# Patient Record
(demographics unavailable — no encounter records)

---

## 2024-10-15 NOTE — HISTORY OF PRESENT ILLNESS
[None] : None [FreeTextEntry1] : Mr. Koroma is a very pleasant 68 year old man here today for elevated PSA and abnormal MRI. He was referred by Dr. Terry Ferreira. He is accompanied by caregiver. He reports a PSA last year of over 6. This year it was 21. Prostate MRI images reviewed shows PIRADS 4 in the setting of a 34 cc prostate. He denies any hematuria, dysuria or difficulties voiding. He denies straining and feels like he empties completely. Denies any family history of urological cancers. Currently smokes 1 cigarette a day.

## 2024-10-15 NOTE — ASSESSMENT
[FreeTextEntry1] : Mr. Koroma is a very pleasant 68 year old man here today for elevated PSA and abnormal MRI. He was referred by Dr. Terry Ferreira. He is accompanied by caregiver. He reports a PSA last year of over 6. This year it was 21. Prostate MRI images from [facility] radiology reviewed shows PIRADS 4 overall suspicion score in the setting of a 34 cc prostate. He denies any hematuria, dysuria or difficulties voiding. He denies straining and feels like he empties completely. Denies any family history of urological cancers. Currently smokes 1 cigarette a day. Patient was counseled on smoking cessation for 5 minutes.  We discussed various health benefits of quitting.  We discussed the potential Urologic implications of smoking, including an increased risk of bladder and upper tract urothelial carcinoma, kidney cancer, and a potential link to erectile dysfunction. I discussed the recommendation to perform a transrectal ultrasound-guided prostate biopsy with the patient.  I reviewed the potential etiologies of an elevated PSA with the patient, including but not limited to prostate cancer, benign prostatic hyperplasia (BPH), inflammation of the prostate, urinary tract infection (UTI), older age, and sexual intercourse. I discussed the risks of a prostate biopsy with the patient, including but not limited to pain, rectal bleeding, blood in the urine and semen, difficulty or inability to urinate, and infection. We discussed the procedure itself, including the need to place a transrectal ultrasound probe in the rectum and take systematic biopsies of the prostate gland after providing a local anesthetic agent.  I explained the ricardo-procedural preparations that the patient will need to take, including self-administration of an enema the day of the biopsy. He wishes to proceed and we will schedule the biopsy for the near future. Urine culture  Patient is being seen today for evaluation and management of a chronic and longitudinal ongoing condition and I am of the primary treating physician

## 2024-10-25 NOTE — ASSESSMENT
[FreeTextEntry1] : Very pleasant 68-year-old gentleman who presents for follow-up of elevated PSA, abnormal MRI -PSA 21 -Given significant difficulty obtaining MRI images, as well as images performed over 15 months ago, we will repeat MRI again at this time -We discussed the differences between prostate MRI and a prostate biopsy for evaluation for prostate cancer.  We discussed the risks and benefits of both a prostate biopsy versus a prostate MRI, including the limitations of both.  We discussed the benefit of obtaining an MRI prior to a prostate biopsy with the advantage of being able to do a transperineal fusion biopsy after MRI.  After thorough discussion of the risks and benefits of each he would like to proceed with a prostate MRI in anticipation of a fusion biopsy  Patient is being seen today for evaluation and management of a chronic and longitudinal ongoing condition and I am of the primary treating physician

## 2024-10-25 NOTE — REASON FOR VISIT
Group Topic: BH Check-in/Symptom Rating    Date: 8/16/2022  Start Time: 0900  End Time: 0945  Facilitators: Amanda Betancourt OT    Focus: Check-in  Number in attendance: 7    Method: Group  Attendance: Present  Participation: Active  Patient Response: Attentive  Mood: Normal  Affect: Type: Euthymic (normal mood)   Range: Full (normal)   Congruency: Congruent   Stability: Stable  Behavior/Socialization: Appropriate to group  Thought Process: Tracking  Task Performance: Follows directions  Patient Evaluation: Independent - full participation       [Follow-up Visit ___] : a follow-up visit  for [unfilled]

## 2024-10-25 NOTE — HISTORY OF PRESENT ILLNESS
[FreeTextEntry1] : Very pleasant 68-year-old gentleman who presents for follow-up of elevated PSA, abnormal MRI.  He underwent an MRI 7/2023 at [facility].  His PSA was recently noted to rise to 21.  He presents today to again discuss additional options for management.  Unfortunately, MRI images from [facility] were not sent, despite contacting [facility] numerous times to have them pushed to the system.

## 2024-12-04 NOTE — HISTORY OF PRESENT ILLNESS
[FreeTextEntry1] : Very pleasant 68-year-old gentleman who presents for follow-up of elevated PSA, abnormal MRI, new diagnosis of prostate cancer.  His PSA was recently noted to rise to 21.  Prostate MRI demonstrates an overall suspicion score of PIRADS 5. He therefore underwent a fusion guided prostate biopsy which demonstrated multiple cores  of Diamante group grade 2, 3, 4, 5 prostate cancer on the right.  He presents today to discuss these results as well as further management options.  No problems after the biopsy.  No fevers or chills.  No hematuria.

## 2024-12-04 NOTE — DISEASE MANAGEMENT
[1] : T1 [c] : c [X] : MX [>20] : >20 ng/mL [Biopsy with Fusion] : Patient had a biopsy with fusion on [8] : Template Biopsy Diamante Score: 8 [9] : Fusion Biopsy Diamante Score: 9 [] : Patient had a Prostate MRI [5] : 5 [IIIC] : IIIC [TotalCores] : 15 [TotalPositiveCores] : 8

## 2025-01-17 NOTE — PHYSICAL EXAM
[Normal] : oriented to person, place and time, the affect was normal, the mood was normal and not anxious No

## 2025-01-17 NOTE — DISEASE MANAGEMENT
[1] : T1 [c] : c [X] : MX [>20] : >20 ng/mL [Biopsy with Fusion] : Patient had a biopsy with fusion on [8] : Template Biopsy Diamante Score: 8 [9] : Fusion Biopsy Diamante Score: 9 [] : Patient had a Prostate MRI [5] : 5 [TotalCores] : 15 [TotalPositiveCores] : 8 [IIIC] : IIIC

## 2025-01-17 NOTE — HISTORY OF PRESENT ILLNESS
[FreeTextEntry1] : Very pleasant 68-year-old gentleman who presents for follow-up of elevated PSA, abnormal MRI, new diagnosis of prostate cancer.  His PSA was recently noted to rise to 21.  Prostate MRI demonstrates an overall suspicion score of PIRADS 5. He therefore underwent a fusion guided prostate biopsy which demonstrated multiple cores  of Diamante group grade 2, 3, 4, 5 prostate cancer on the right.  He recently underwent a PSMA PET scan which demonstrated evidence of active disease in the prostate but no evidence of metastatic disease.  He presents today to discuss these results as well as further management options.

## 2025-01-30 NOTE — REASON FOR VISIT
[Consideration of Curative Therapy] : consideration of curative therapy for prostate cancer [Formal Caregiver] : formal caregiver [Family Member] : family member

## 2025-01-30 NOTE — REASON FOR VISIT
"Chief Complaint   Patient presents with     Anxiety     initial /67 (BP Location: Right arm, Cuff Size: Adult Regular)   Pulse 83   Temp 98.9  F (37.2  C) (Oral)   Resp 16   Ht 1.753 m (5' 9\")   Wt 86 kg (189 lb 8 oz)   SpO2 98%   BMI 27.98 kg/m   Estimated body mass index is 27.98 kg/m  as calculated from the following:    Height as of this encounter: 1.753 m (5' 9\").    Weight as of this encounter: 86 kg (189 lb 8 oz).  BP completed using cuff size: regular.   R arm      Health Maintenance that is potentially due pending provider review:  NONE    n/a    Brant Nunez ma  " [Consideration of Curative Therapy] : consideration of curative therapy for prostate cancer [Formal Caregiver] : formal caregiver [Family Member] : family member

## 2025-02-05 NOTE — LETTER GREETING
[Dear  ___] : Dear  [unfilled], [Follow-Up] : Your patient, [unfilled] was seen in my office today for follow-up [Please see my note below.] : Please see my note below. [FreeTextEntry2] : Cory Mittal  [place], [place]

## 2025-02-05 NOTE — LETTER CLOSING
[Consult Closing:] : Thank you for allowing me to participate in the care of this patient.  If you have any questions, please do not hesitate to contact me. [FreeTextEntry3] : Sincerely,      Obie Silva MD, FACS Director of Urology Services, [facility] Chief of Urology, [facility]  of Urology   [facility] for [facility]rology, [place] [id number] P: [phone] F: [phone] [facility]

## 2025-02-05 NOTE — LETTER CLOSING
[Consult Closing:] : Thank you for allowing me to participate in the care of this patient.  If you have any questions, please do not hesitate to contact me. [FreeTextEntry3] : Sincerely,      Obie Silva MD, FACS Director of Urology Services, [facility] Chief of Urology, [facility]  of Urology   [facility] for Urology, [place] [id number] P: [phone] F: [phone] [facility]

## 2025-02-05 NOTE — HISTORY OF PRESENT ILLNESS
[FreeTextEntry1] : ELIZABETH ARIAS presents to the office today. He is a 68 year-old man, referred by Dr. Darden to discuss prostate cancer treatment. Biopsy showed [place] 4+5, [place] 4+3, and [place] 3+4. He recently underwent a PSMA PET scan which demonstrated evidence of active disease in the prostate but no evidence of metastatic disease.  Patient has underlying medical issues including diagnosis of Parkinson's disease from about a year ago and he has been placed on medical therapy for this.  He also has been having some balance issues with a few falls but no serious injuries.  His aide with him today also reports that he has some herniated disks up in his cervical spine region and he has a surgical appointment pending to evaluate for possible surgery to correct the herniated disks.  He is mobile but often needs assistance.  He denies any significant bothersome urinary symptoms at this time such as weak stream, hesitancy or sensation of incomplete emptying.  He denies urgency or frequency issues.

## 2025-02-05 NOTE — LETTER CLOSING
[Consult Closing:] : Thank you for allowing me to participate in the care of this patient.  If you have any questions, please do not hesitate to contact me. [FreeTextEntry3] : Sincerely,      Obie Silva MD, FACS Director of Urology Services, [facility] Chief of Urology, [facility]  of Urology   [facility] for Urology, [place] [id number] P: [phone] F: [phone] [facility]

## 2025-02-05 NOTE — HISTORY OF PRESENT ILLNESS
[FreeTextEntry1] : ELIZABETH ARIAS presents to the office today. He is a 68 year-old man, referred by Dr. Darden to discuss prostate cancer treatment. Biopsy showed [place] 4+5, [place] 4+3, and [place] 3+4. He recently underwent a PSMA PET scan which demonstrated evidence of active disease in the prostate but no evidence of metastatic disease.  Patient has underlying medical issues including diagnosis of Parkinson's disease from about a year ago and he has been placed on medical therapy for this.  He also has been having some balance issues with a few falls but no serious injuries.  His aide with him today also reports that he has some herniated disks up in his cervical spine region and he has a surgical appointment pending to evaluate for possible surgery to correct the herniated disks.  He is mobile but often needs assistance.  He denies any significant bothersome urinary symptoms at this time such as weak stream, hesitancy or sensation of incomplete emptying.  He denies urgency or frequency issues.

## 2025-02-05 NOTE — LETTER GREETING
[Dear  ___] : Dear  [unfilled], [Follow-Up] : Your patient, [unfilled] was seen in my office today for follow-up [Please see my note below.] : Please see my note below. [FreeTextEntry2] : Cory Mittal  [place], [place]

## 2025-02-05 NOTE — DISEASE MANAGEMENT
[c] : c [1] : T1 [X] : MX [>20] : >20 ng/mL [Biopsy with Fusion] : Patient had a biopsy with fusion on [8] : Template Biopsy Diamante Score: 8 [9] : Fusion Biopsy Diamante Score: 9 [] : Patient had a Prostate MRI [5] : 5 [IIIC] : IIIC [TotalCores] : 15 [TotalPositiveCores] : 8

## 2025-02-05 NOTE — LETTER GREETING
[Dear  ___] : Dear  [unfilled], [Follow-Up] : Your patient, [unfilled] was seen in my office today for follow-up [Please see my note below.] : Please see my note below. [FreeTextEntry2] : Cory Mittal  [place], [place]

## 2025-02-05 NOTE — HISTORY OF PRESENT ILLNESS
[FreeTextEntry1] : ELIZABETH ARIAS presents to the office today. He is a 68 year-old man, referred by Dr. Darden to discuss prostate cancer treatment. Biopsy showed [place] 4+5, [place] 4+3, and [place] 3+4. He recently underwent a PSMA PET scan which demonstrated evidence of active disease in the prostate but no evidence of metastatic disease.  Patient has underlying medical issues including diagnosis of Parkinson's disease from about a year ago and he has been placed on medical therapy for this.  He also has been having some balance issues with a few falls but no serious injuries.  His aide with him today also reports that he has some herniated disks up in his cervical spine region and he has a surgical appointment pending to evaluate for possible surgery to correct the herniated disks.  He is mobile but often needs assistance.  He denies any significant bothersome urinary symptoms at this time such as weak stream, hesitancy or sensation of incomplete emptying.  He denies urgency or frequency issues.

## 2025-02-07 NOTE — REASON FOR VISIT
[Consideration of Curative Therapy] : consideration of curative therapy for prostate cancer [Family Member] : family member [Formal Caregiver] : formal caregiver

## 2025-02-07 NOTE — HISTORY OF PRESENT ILLNESS
[FreeTextEntry1] : Very pleasant 68-year-old gentleman who presents for follow-up of elevated PSA, abnormal MRI, new diagnosis of prostate cancer.  His PSA was recently noted to rise to 21.  Prostate MRI demonstrates an overall suspicion score of PIRADS 5. He therefore underwent a fusion guided prostate biopsy which demonstrated multiple cores  of [place] group grade 2, 3, 4, 5 prostate cancer on the right.  He recently underwent a PSMA PET scan which demonstrated evidence of active disease in the prostate but no evidence of metastatic disease.  He saw Dr. Silva in consultation for surgery, however has elected to proceed with radiation.  He presents today to discuss options for management moving forward.

## 2025-02-14 NOTE — VITALS
[Maximal Pain Intensity: 0/10] : 0/10 [Least Pain Intensity: 0/10] : 0/10 [70: Cares for self; unalbe to carry on normal activity or do active work.] : 70: Cares for self; unable to carry on normal activity or do active work. [ECOG Performance Status: 2 - Ambulatory and capable of all self care but unable to carry out any work activities] : Performance Status: 2 - Ambulatory and capable of all self care but unable to carry out any work activities. Up and about more than 50% of waking hours [5 - Distress Level] : Distress Level: 5

## 2025-02-17 NOTE — REVIEW OF SYSTEMS
[IPSS Score (0-40): ___] : IPSS score: [unfilled] [Negative] : Allergic/Immunologic [FreeTextEntry2] : current smoker [FreeTextEntry5] : MYKEL [FreeTextEntry8] : prostate cancer [de-identified] : Parkinson disease [de-identified] : hypothyroidism

## 2025-02-17 NOTE — REVIEW OF SYSTEMS
[IPSS Score (0-40): ___] : IPSS score: [unfilled] [Negative] : Allergic/Immunologic [FreeTextEntry2] : current smoker [FreeTextEntry5] : MYKEL [FreeTextEntry8] : prostate cancer [de-identified] : Parkinson disease [de-identified] : hypothyroidism

## 2025-02-17 NOTE — HISTORY OF PRESENT ILLNESS
[FreeTextEntry1] : Very pleasant 68-year-old gentleman who presents for follow-up of elevated PSA, abnormal MRI, new diagnosis of prostate cancer.  His PSA was recently noted to rise to 21.  Prostate MRI demonstrates an overall suspicion score of PIRADS 5. He therefore underwent a fusion guided prostate biopsy which demonstrated multiple cores  of [place] group grade 2, 3, 4, 5 prostate cancer on the right.  He recently underwent a PSMA PET scan which demonstrated evidence of active disease in the prostate but no evidence of metastatic disease.  He saw Dr. Silva in consultation for surgery, however has elected to proceed with radiation.  He presents today to discuss options for management moving forward.

## 2025-02-17 NOTE — PHYSICAL EXAM
[Normal] : supple with no thyromegaly or masses appreciated [Exaggerated Use Of Accessory Muscles For Inspiration] : no accessory muscle use [Musculoskeletal - Swelling] : no joint swelling [Nail Clubbing] : no clubbing  or cyanosis of the fingernails [Skin Color & Pigmentation] : normal skin color and pigmentation [] : no rash [No Focal Deficits] : no focal deficits [Sensation] : the sensory exam was normal to light touch and pinprick

## 2025-02-17 NOTE — HISTORY OF PRESENT ILLNESS
[FreeTextEntry1] : Very pleasant 68-year-old gentleman who presents for follow-up of elevated PSA, abnormal MRI, new diagnosis of prostate cancer.  His PSA was recently noted to rise to 21.  Prostate MRI demonstrates an overall suspicion score of PIRADS 5. He therefore underwent a fusion guided prostate biopsy which demonstrated multiple cores  of [place] group grade 2, 3, 4, 5 prostate cancer on the right.  He recently underwent a PSMA PET scan which demonstrated evidence of active disease in the prostate but no evidence of metastatic disease.  He saw Dr. Silva in consultation for surgery, however has elected to proceed with radiation.  He presents today to discuss options for management moving forward.

## 2025-04-10 NOTE — REASON FOR VISIT
[Routine On-Treatment] : a routine on-treatment visit for [Prostate Cancer] : prostate cancer [Family Member] : family member [Formal Caregiver] : formal caregiver

## 2025-04-16 NOTE — REVIEW OF SYSTEMS
[IPSS Score (0-40): ___] : IPSS score: [unfilled] [Negative] : Allergic/Immunologic [Anal Pain: Grade 0] : Anal Pain: Grade 0 [Constipation: Grade 0] : Constipation: Grade 0 [Diarrhea: Grade 0] : Diarrhea: Grade 0 [Dyspepsia: Grade 0] : Dyspepsia: Grade 0 [Dysphagia: Grade 0] : Dysphagia: Grade 0 [Esophagitis: Grade 0] : Esophagitis: Grade 0 [Fecal Incontinence: Grade 0] : Fecal Incontinence: Grade 0 [Gastroparesis: Grade 0] : Gastroparesis: Grade 0 [Nausea: Grade 0] : Nausea: Grade 0 [Proctitis: Grade 0] : Proctitis: Grade 0 [Rectal Pain: Grade 0] : Rectal Pain: Grade 0 [Small Intestinal Obstruction: Grade 0] : Small Intestinal Obstruction: Grade 0 [Vomiting: Grade 0] : Vomiting: Grade 0 [Hematuria: Grade 0] : Hematuria: Grade 0 [Urinary Incontinence: Grade 0] : Urinary Incontinence: Grade 0  [Urinary Retention: Grade 0] : Urinary Retention: Grade 0 [Urinary Tract Pain: Grade 0] : Urinary Tract Pain: Grade 0 [Urinary Urgency: Grade 0] : Urinary Urgency: Grade 0 [Urinary Frequency: Grade 0] : Urinary Frequency: Grade 0 [FreeTextEntry2] : current smoker [FreeTextEntry5] : MYKEL [FreeTextEntry8] : prostate cancer [de-identified] : Parkinson disease [de-identified] : hypothyroidism

## 2025-04-16 NOTE — REVIEW OF SYSTEMS
[IPSS Score (0-40): ___] : IPSS score: [unfilled] [Negative] : Allergic/Immunologic [Anal Pain: Grade 0] : Anal Pain: Grade 0 [Constipation: Grade 0] : Constipation: Grade 0 [Diarrhea: Grade 0] : Diarrhea: Grade 0 [Dyspepsia: Grade 0] : Dyspepsia: Grade 0 [Dysphagia: Grade 0] : Dysphagia: Grade 0 [Esophagitis: Grade 0] : Esophagitis: Grade 0 [Fecal Incontinence: Grade 0] : Fecal Incontinence: Grade 0 [Gastroparesis: Grade 0] : Gastroparesis: Grade 0 [Nausea: Grade 0] : Nausea: Grade 0 [Proctitis: Grade 0] : Proctitis: Grade 0 [Rectal Pain: Grade 0] : Rectal Pain: Grade 0 [Small Intestinal Obstruction: Grade 0] : Small Intestinal Obstruction: Grade 0 [Vomiting: Grade 0] : Vomiting: Grade 0 [Hematuria: Grade 0] : Hematuria: Grade 0 [Urinary Incontinence: Grade 0] : Urinary Incontinence: Grade 0  [Urinary Retention: Grade 0] : Urinary Retention: Grade 0 [Urinary Tract Pain: Grade 0] : Urinary Tract Pain: Grade 0 [Urinary Urgency: Grade 0] : Urinary Urgency: Grade 0 [Urinary Frequency: Grade 0] : Urinary Frequency: Grade 0 [FreeTextEntry2] : current smoker [FreeTextEntry5] : MYKEL [FreeTextEntry8] : prostate cancer [de-identified] : Parkinson disease [de-identified] : hypothyroidism

## 2025-04-16 NOTE — HISTORY OF PRESENT ILLNESS
[FreeTextEntry1] : Very pleasant 68-year-old gentleman who presents for follow-up of elevated PSA, abnormal MRI, new diagnosis of prostate cancer.  His PSA was recently noted to rise to 21.  Prostate MRI demonstrates an overall suspicion score of PIRADS 5. He therefore underwent a fusion guided prostate biopsy which demonstrated multiple cores  of Diamante group grade 2, 3, 4, 5 prostate cancer on the right.  He recently underwent a PSMA PET scan which demonstrated evidence of active disease in the prostate but no evidence of metastatic disease.  He saw Dr. Silva in consultation for surgery, however has elected to proceed with radiation.  He presents today to discuss options for management moving forward.  4/10/2025 OTV. Using walker. 10/28 Fx of RT to prostate were completed. No urinary complaints. Nocturiax2.

## 2025-04-16 NOTE — DISEASE MANAGEMENT
[1] : T1 [c] : c [X] : MX [>20] : >20 ng/mL [Biopsy with Fusion] : Patient had a biopsy with fusion on [8] : Template Biopsy Diamante Score: 8 [9] : Fusion Biopsy Diamante Score: 9 [] : Patient had a Prostate MRI [5] : 5 [Clinical] : TNM Stage: c [IIIC] : IIIC [TotalCores] : 15 [TotalPositiveCores] : 8 [TTNM] : 1c [NTNM] : x [MTNM] : x [de-identified] : 70 Gy [de-identified] : prostate

## 2025-04-16 NOTE — DISEASE MANAGEMENT
[1] : T1 [c] : c [X] : MX [>20] : >20 ng/mL [Biopsy with Fusion] : Patient had a biopsy with fusion on [8] : Template Biopsy Diamante Score: 8 [9] : Fusion Biopsy Diamante Score: 9 [] : Patient had a Prostate MRI [5] : 5 [Clinical] : TNM Stage: c [IIIC] : IIIC [TotalCores] : 15 [TotalPositiveCores] : 8 [TTNM] : 1c [NTNM] : x [MTNM] : x [de-identified] : 70 Gy [de-identified] : prostate

## 2025-04-23 NOTE — REVIEW OF SYSTEMS
[IPSS Score (0-40): ___] : IPSS score: [unfilled] [Negative] : Allergic/Immunologic [Anal Pain: Grade 0] : Anal Pain: Grade 0 [Constipation: Grade 0] : Constipation: Grade 0 [Diarrhea: Grade 0] : Diarrhea: Grade 0 [Dyspepsia: Grade 0] : Dyspepsia: Grade 0 [Dysphagia: Grade 0] : Dysphagia: Grade 0 [Esophagitis: Grade 0] : Esophagitis: Grade 0 [Fecal Incontinence: Grade 0] : Fecal Incontinence: Grade 0 [Gastroparesis: Grade 0] : Gastroparesis: Grade 0 [Nausea: Grade 0] : Nausea: Grade 0 [Proctitis: Grade 0] : Proctitis: Grade 0 [Rectal Pain: Grade 0] : Rectal Pain: Grade 0 [Small Intestinal Obstruction: Grade 0] : Small Intestinal Obstruction: Grade 0 [Vomiting: Grade 0] : Vomiting: Grade 0 [Hematuria: Grade 0] : Hematuria: Grade 0 [Urinary Incontinence: Grade 0] : Urinary Incontinence: Grade 0  [Urinary Retention: Grade 2 - Placement of urinary, suprapubic or intermittent catheter placement indicated; medication indicated] : Urinary Retention: Grade 2 - Placement of urinary, suprapubic or intermittent catheter placement indicated; medication indicated [Urinary Tract Pain: Grade 1 - Mild pain] : Urinary Tract Pain: Grade 1 - Mild pain [Urinary Urgency: Grade 1 - Present] : Urinary Urgency: Grade 1 - Present [Urinary Frequency: Grade 1 - Present] : Urinary Frequency: Grade 1 - Present [FreeTextEntry2] : current smoker [FreeTextEntry5] : MYKEL [FreeTextEntry8] : prostate cancer [de-identified] : Parkinson disease [de-identified] : hypothyroidism

## 2025-04-23 NOTE — HISTORY OF PRESENT ILLNESS
[FreeTextEntry1] : Very pleasant 68-year-old gentleman who presents for follow-up of elevated PSA, abnormal MRI, new diagnosis of prostate cancer.  His PSA was recently noted to rise to 21.  Prostate MRI demonstrates an overall suspicion score of PIRADS 5. He therefore underwent a fusion guided prostate biopsy which demonstrated multiple cores  of Diamante group grade 2, 3, 4, 5 prostate cancer on the right.  He recently underwent a PSMA PET scan which demonstrated evidence of active disease in the prostate but no evidence of metastatic disease.  He saw Dr. Silva in consultation for surgery, however has elected to proceed with radiation.  He presents today to discuss options for management moving forward.  4/17/2025 OTV. Using walker. 15/28 Fx of RT to prostate were completed. c/o urinary frequency and burning sensation on urination. c/o nausea. May consider Rx flomax, Rx blood test given to be done soon.

## 2025-04-23 NOTE — DISEASE MANAGEMENT
[Clinical] : TNM Stage: c [IIIC] : IIIC [TTNM] : 1c [NTNM] : x [MTNM] : x [de-identified] : 70 Gy [de-identified] : prostate

## 2025-04-23 NOTE — REVIEW OF SYSTEMS
[IPSS Score (0-40): ___] : IPSS score: [unfilled] [Negative] : Allergic/Immunologic [Anal Pain: Grade 0] : Anal Pain: Grade 0 [Constipation: Grade 0] : Constipation: Grade 0 [Diarrhea: Grade 0] : Diarrhea: Grade 0 [Dyspepsia: Grade 0] : Dyspepsia: Grade 0 [Dysphagia: Grade 0] : Dysphagia: Grade 0 [Esophagitis: Grade 0] : Esophagitis: Grade 0 [Fecal Incontinence: Grade 0] : Fecal Incontinence: Grade 0 [Gastroparesis: Grade 0] : Gastroparesis: Grade 0 [Nausea: Grade 0] : Nausea: Grade 0 [Proctitis: Grade 0] : Proctitis: Grade 0 [Rectal Pain: Grade 0] : Rectal Pain: Grade 0 [Small Intestinal Obstruction: Grade 0] : Small Intestinal Obstruction: Grade 0 [Vomiting: Grade 0] : Vomiting: Grade 0 [Hematuria: Grade 0] : Hematuria: Grade 0 [Urinary Incontinence: Grade 0] : Urinary Incontinence: Grade 0  [Urinary Retention: Grade 2 - Placement of urinary, suprapubic or intermittent catheter placement indicated; medication indicated] : Urinary Retention: Grade 2 - Placement of urinary, suprapubic or intermittent catheter placement indicated; medication indicated [Urinary Tract Pain: Grade 1 - Mild pain] : Urinary Tract Pain: Grade 1 - Mild pain [Urinary Urgency: Grade 1 - Present] : Urinary Urgency: Grade 1 - Present [Urinary Frequency: Grade 1 - Present] : Urinary Frequency: Grade 1 - Present [FreeTextEntry2] : current smoker [FreeTextEntry5] : MYKEL [FreeTextEntry8] : prostate cancer [de-identified] : Parkinson disease [de-identified] : hypothyroidism

## 2025-04-23 NOTE — DISEASE MANAGEMENT
[Clinical] : TNM Stage: c [IIIC] : IIIC [TTNM] : 1c [NTNM] : x [MTNM] : x [de-identified] : 70 Gy [de-identified] : prostate

## 2025-05-22 NOTE — REASON FOR VISIT
[Routine On-Treatment] : a routine on-treatment visit for [Prostate Cancer] : prostate cancer [Formal Caregiver] : formal caregiver

## 2025-05-26 NOTE — HISTORY OF PRESENT ILLNESS
[FreeTextEntry1] : Very pleasant 68-year-old gentleman who presents for follow-up of elevated PSA, abnormal MRI, new diagnosis of prostate cancer.  His PSA was recently noted to rise to 21.  Prostate MRI demonstrates an overall suspicion score of PIRADS 5. He therefore underwent a fusion guided prostate biopsy which demonstrated multiple cores  of Diamante group grade 2, 3, 4, 5 prostate cancer on the right.  He recently underwent a PSMA PET scan which demonstrated evidence of active disease in the prostate but no evidence of metastatic disease.  He saw Dr. Silva in consultation for surgery, however has elected to proceed with radiation.  He presents today to discuss options for management moving forward.  10/2024 -- PSA 21 as per urologist note. 11/2024 -- pathology showed prostate adenocarcinoma. Diamante 4+5 in 1 core, GS 4+3 in 1 core, GS 3+4 in 1 core. A total of 15 cores biopsied.   5/22/2025 OTV. Using walker. 27/28 Fx of RT to prostate were completed. Pt is back from hospitalization for heart disease. On ADT with urologist since 2/2025.

## 2025-05-26 NOTE — REVIEW OF SYSTEMS
[IPSS Score (0-40): ___] : IPSS score: [unfilled] [Negative] : Allergic/Immunologic [Anal Pain: Grade 0] : Anal Pain: Grade 0 [Constipation: Grade 0] : Constipation: Grade 0 [Diarrhea: Grade 0] : Diarrhea: Grade 0 [Dyspepsia: Grade 0] : Dyspepsia: Grade 0 [Dysphagia: Grade 0] : Dysphagia: Grade 0 [Esophagitis: Grade 0] : Esophagitis: Grade 0 [Fecal Incontinence: Grade 0] : Fecal Incontinence: Grade 0 [Gastroparesis: Grade 0] : Gastroparesis: Grade 0 [Nausea: Grade 0] : Nausea: Grade 0 [Proctitis: Grade 0] : Proctitis: Grade 0 [Rectal Pain: Grade 0] : Rectal Pain: Grade 0 [Small Intestinal Obstruction: Grade 0] : Small Intestinal Obstruction: Grade 0 [Vomiting: Grade 0] : Vomiting: Grade 0 [Hematuria: Grade 0] : Hematuria: Grade 0 [Urinary Incontinence: Grade 0] : Urinary Incontinence: Grade 0  [Urinary Retention: Grade 1 - Urinary, suprapubic or intermittent catheter placement not indicated; able to void with some residual] : Urinary Retention: Grade 1 - Urinary, suprapubic or intermittent catheter placement not indicated; able to void with some residual [Urinary Tract Pain: Grade 0] : Urinary Tract Pain: Grade 0 [Urinary Urgency: Grade 0] : Urinary Urgency: Grade 0 [Urinary Frequency: Grade 1 - Present] : Urinary Frequency: Grade 1 - Present [FreeTextEntry2] : current smoker [FreeTextEntry5] : MYKEL [FreeTextEntry8] : prostate cancer [de-identified] : Parkinson disease [de-identified] : hypothyroidism

## 2025-05-26 NOTE — DISEASE MANAGEMENT
[Clinical] : TNM Stage: c [IIIC] : IIIC [TTNM] : 1c [NTNM] : x [MTNM] : x [de-identified] : 70 Gy [de-identified] : prostate

## 2025-05-26 NOTE — REVIEW OF SYSTEMS
[IPSS Score (0-40): ___] : IPSS score: [unfilled] [Negative] : Allergic/Immunologic [Anal Pain: Grade 0] : Anal Pain: Grade 0 [Constipation: Grade 0] : Constipation: Grade 0 [Diarrhea: Grade 0] : Diarrhea: Grade 0 [Dyspepsia: Grade 0] : Dyspepsia: Grade 0 [Dysphagia: Grade 0] : Dysphagia: Grade 0 [Esophagitis: Grade 0] : Esophagitis: Grade 0 [Fecal Incontinence: Grade 0] : Fecal Incontinence: Grade 0 [Gastroparesis: Grade 0] : Gastroparesis: Grade 0 [Nausea: Grade 0] : Nausea: Grade 0 [Proctitis: Grade 0] : Proctitis: Grade 0 [Rectal Pain: Grade 0] : Rectal Pain: Grade 0 [Small Intestinal Obstruction: Grade 0] : Small Intestinal Obstruction: Grade 0 [Vomiting: Grade 0] : Vomiting: Grade 0 [Hematuria: Grade 0] : Hematuria: Grade 0 [Urinary Incontinence: Grade 0] : Urinary Incontinence: Grade 0  [Urinary Retention: Grade 1 - Urinary, suprapubic or intermittent catheter placement not indicated; able to void with some residual] : Urinary Retention: Grade 1 - Urinary, suprapubic or intermittent catheter placement not indicated; able to void with some residual [Urinary Tract Pain: Grade 0] : Urinary Tract Pain: Grade 0 [Urinary Urgency: Grade 0] : Urinary Urgency: Grade 0 [Urinary Frequency: Grade 1 - Present] : Urinary Frequency: Grade 1 - Present [FreeTextEntry2] : current smoker [FreeTextEntry5] : MYKEL [FreeTextEntry8] : prostate cancer [de-identified] : Parkinson disease [de-identified] : hypothyroidism

## 2025-05-26 NOTE — DISEASE MANAGEMENT
[Clinical] : TNM Stage: c [IIIC] : IIIC [TTNM] : 1c [NTNM] : x [MTNM] : x [de-identified] : 70 Gy [de-identified] : prostate

## 2025-07-25 NOTE — REASON FOR VISIT
[Routine Follow-Up] : routine follow-up visit for [Prostate Cancer] : prostate cancer [Formal Caregiver] : formal caregiver

## 2025-07-28 NOTE — DISEASE MANAGEMENT
[Clinical] : TNM Stage: c [IIIC] : IIIC [TTNM] : 1c [NTNM] : x [MTNM] : x [de-identified] : 70 Gy [de-identified] : prostate

## 2025-07-28 NOTE — HISTORY OF PRESENT ILLNESS
[FreeTextEntry1] : Very pleasant 68-year-old gentleman who presents for follow-up of elevated PSA, abnormal MRI, new diagnosis of prostate cancer.  His PSA was recently noted to rise to 21.  Prostate MRI demonstrates an overall suspicion score of PIRADS 5. He therefore underwent a fusion guided prostate biopsy which demonstrated multiple cores  of Diamante group grade 2, 3, 4, 5 prostate cancer on the right.  He recently underwent a PSMA PET scan which demonstrated evidence of active disease in the prostate but no evidence of metastatic disease.  He saw Dr. Silva in consultation for surgery, however has elected to proceed with radiation.  He presents today to discuss options for management moving forward.  10/2024 -- PSA 21 as per urologist note. 11/2024 -- pathology showed prostate adenocarcinoma. Diamante 4+5 in 1 core, GS 4+3 in 1 core, GS 3+4 in 1 core. A total of 15 cores biopsied.   5/22/2025 OTV. Using walker. 27/28 Fx of RT to prostate were completed. Pt is back from hospitalization for heart disease. On ADT with urologist since 2/2025.  7/25/2025 F/U. Pt completed 70Gy / 28 Fx of RT to prostate on 5/23/2025. On flomax. c/o incontinence, wearing diaper. From [facility] ([phone]), with dementia, h/o hospitalization of stroke in 6/2025, in wheelchair. Advised to continue f/u with urologist for ADT.

## 2025-07-28 NOTE — REVIEW OF SYSTEMS
[IPSS Score (0-40): ___] : IPSS score: [unfilled] [Negative] : Allergic/Immunologic [Anal Pain: Grade 0] : Anal Pain: Grade 0 [Constipation: Grade 0] : Constipation: Grade 0 [Diarrhea: Grade 0] : Diarrhea: Grade 0 [Dyspepsia: Grade 0] : Dyspepsia: Grade 0 [Dysphagia: Grade 0] : Dysphagia: Grade 0 [Esophagitis: Grade 0] : Esophagitis: Grade 0 [Fecal Incontinence: Grade 0] : Fecal Incontinence: Grade 0 [Gastroparesis: Grade 0] : Gastroparesis: Grade 0 [Nausea: Grade 0] : Nausea: Grade 0 [Proctitis: Grade 0] : Proctitis: Grade 0 [Rectal Pain: Grade 0] : Rectal Pain: Grade 0 [Small Intestinal Obstruction: Grade 0] : Small Intestinal Obstruction: Grade 0 [Vomiting: Grade 0] : Vomiting: Grade 0 [Hematuria: Grade 0] : Hematuria: Grade 0 [Urinary Incontinence: Grade 2 - Spontaneous; pads indicated; limiting instrumental ADL] : Urinary Incontinence: Grade 2 - Spontaneous; pads indicated; limiting instrumental ADL [Urinary Retention: Grade 2 - Placement of urinary, suprapubic or intermittent catheter placement indicated; medication indicated] : Urinary Retention: Grade 2 - Placement of urinary, suprapubic or intermittent catheter placement indicated; medication indicated [Urinary Tract Pain: Grade 0] : Urinary Tract Pain: Grade 0 [Urinary Urgency: Grade 0] : Urinary Urgency: Grade 0 [Urinary Frequency: Grade 2 - Limiting instrumental ADL; medical management indicated] : Urinary Frequency: Grade 2 - Limiting instrumental ADL; medical management indicated [FreeTextEntry5] : MYKEL [FreeTextEntry8] : prostate cancer [de-identified] : Parkinson disease [de-identified] : hypothyroidism [FreeTextEntry2] : wearing diaper

## 2025-07-28 NOTE — DISEASE MANAGEMENT
[Clinical] : TNM Stage: c [IIIC] : IIIC [TTNM] : 1c [NTNM] : x [MTNM] : x [de-identified] : 70 Gy [de-identified] : prostate

## 2025-07-28 NOTE — PHYSICAL EXAM
[Normal] : oriented to person, place and time, the affect was normal, the mood was normal and not anxious [de-identified] : In a wheelchair

## 2025-07-28 NOTE — REVIEW OF SYSTEMS
[IPSS Score (0-40): ___] : IPSS score: [unfilled] [Negative] : Allergic/Immunologic [Anal Pain: Grade 0] : Anal Pain: Grade 0 [Constipation: Grade 0] : Constipation: Grade 0 [Diarrhea: Grade 0] : Diarrhea: Grade 0 [Dyspepsia: Grade 0] : Dyspepsia: Grade 0 [Dysphagia: Grade 0] : Dysphagia: Grade 0 [Esophagitis: Grade 0] : Esophagitis: Grade 0 [Fecal Incontinence: Grade 0] : Fecal Incontinence: Grade 0 [Gastroparesis: Grade 0] : Gastroparesis: Grade 0 [Nausea: Grade 0] : Nausea: Grade 0 [Proctitis: Grade 0] : Proctitis: Grade 0 [Rectal Pain: Grade 0] : Rectal Pain: Grade 0 [Small Intestinal Obstruction: Grade 0] : Small Intestinal Obstruction: Grade 0 [Vomiting: Grade 0] : Vomiting: Grade 0 [Hematuria: Grade 0] : Hematuria: Grade 0 [Urinary Incontinence: Grade 2 - Spontaneous; pads indicated; limiting instrumental ADL] : Urinary Incontinence: Grade 2 - Spontaneous; pads indicated; limiting instrumental ADL [Urinary Retention: Grade 2 - Placement of urinary, suprapubic or intermittent catheter placement indicated; medication indicated] : Urinary Retention: Grade 2 - Placement of urinary, suprapubic or intermittent catheter placement indicated; medication indicated [Urinary Tract Pain: Grade 0] : Urinary Tract Pain: Grade 0 [Urinary Urgency: Grade 0] : Urinary Urgency: Grade 0 [Urinary Frequency: Grade 2 - Limiting instrumental ADL; medical management indicated] : Urinary Frequency: Grade 2 - Limiting instrumental ADL; medical management indicated [FreeTextEntry5] : MYKEL [FreeTextEntry8] : prostate cancer [de-identified] : Parkinson disease [de-identified] : hypothyroidism [FreeTextEntry2] : wearing diaper

## 2025-07-28 NOTE — HISTORY OF PRESENT ILLNESS
[FreeTextEntry1] : Very pleasant 68-year-old gentleman who presents for follow-up of elevated PSA, abnormal MRI, new diagnosis of prostate cancer.  His PSA was recently noted to rise to 21.  Prostate MRI demonstrates an overall suspicion score of PIRADS 5. He therefore underwent a fusion guided prostate biopsy which demonstrated multiple cores  of Diamante group grade 2, 3, 4, 5 prostate cancer on the right.  He recently underwent a PSMA PET scan which demonstrated evidence of active disease in the prostate but no evidence of metastatic disease.  He saw Dr. Silva in consultation for surgery, however has elected to proceed with radiation.  He presents today to discuss options for management moving forward.  10/2024 -- PSA 21 as per urologist note. 11/2024 -- pathology showed prostate adenocarcinoma. Diamante 4+5 in 1 core, GS 4+3 in 1 core, GS 3+4 in 1 core. A total of 15 cores biopsied.   5/22/2025 OTV. Using walker. 27/28 Fx of RT to prostate were completed. Pt is back from hospitalization for heart disease. On ADT with urologist since 2/2025.  7/25/2025 F/U. Pt completed 70Gy / 28 Fx of RT to prostate on 5/23/2025. On flomax. c/o incontinence, wearing diaper. From [facility] ([phone]), with dementia, h/o hospitalization of stroke in 6/2025, in wheelchair. Advised to continue f/u with urologist for ADT.

## 2025-07-28 NOTE — PHYSICAL EXAM
[Normal] : oriented to person, place and time, the affect was normal, the mood was normal and not anxious [de-identified] : In a wheelchair